# Patient Record
Sex: MALE | Race: WHITE
[De-identification: names, ages, dates, MRNs, and addresses within clinical notes are randomized per-mention and may not be internally consistent; named-entity substitution may affect disease eponyms.]

---

## 2020-01-02 ENCOUNTER — HOSPITAL ENCOUNTER (OUTPATIENT)
Dept: HOSPITAL 25 - OR | Age: 30
Discharge: HOME | End: 2020-01-02
Attending: ORTHOPAEDIC SURGERY
Payer: COMMERCIAL

## 2020-01-02 VITALS — SYSTOLIC BLOOD PRESSURE: 143 MMHG | DIASTOLIC BLOOD PRESSURE: 82 MMHG

## 2020-01-02 DIAGNOSIS — M25.775: ICD-10-CM

## 2020-01-02 DIAGNOSIS — M25.372: Primary | ICD-10-CM

## 2020-01-02 DIAGNOSIS — M25.371: ICD-10-CM

## 2020-01-02 PROCEDURE — C1776 JOINT DEVICE (IMPLANTABLE): HCPCS

## 2020-01-02 PROCEDURE — L8699 PROSTHETIC IMPLANT NOS: HCPCS

## 2020-01-02 PROCEDURE — C1713 ANCHOR/SCREW BN/BN,TIS/BN: HCPCS

## 2020-01-02 RX ADMIN — FENTANYL CITRATE PRN MCG: 0.05 INJECTION, SOLUTION INTRAMUSCULAR; INTRAVENOUS at 17:03

## 2020-01-02 RX ADMIN — FENTANYL CITRATE PRN MCG: 0.05 INJECTION, SOLUTION INTRAMUSCULAR; INTRAVENOUS at 16:24

## 2020-01-02 RX ADMIN — FENTANYL CITRATE PRN MCG: 0.05 INJECTION, SOLUTION INTRAMUSCULAR; INTRAVENOUS at 16:17

## 2020-01-02 RX ADMIN — FENTANYL CITRATE PRN MCG: 0.05 INJECTION, SOLUTION INTRAMUSCULAR; INTRAVENOUS at 16:34

## 2020-01-02 NOTE — OP
Operative Report - Blank





- Operative Report


Date of Operation: 01/02/20


Note: 


PATIENT: Wale Taylor





YOB: 1990





DATE OF SURGERY: 1/2/2020





SURGEON: Darius Jesus MD





ASSISTANT: EDNA Sumner, whos assistance was necessary for positioning, 

retraction, help with instrumentation, and closure.





ANESTHESIOLOGIST: Dr. Townsend





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 

exostosis.





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 

exostosis.





OPERATION:


1. Left ankle lateral ligament reconstruction with allograft.


2. Left calcaneal saucerization with excision of an enlarged peroneal tubercle.





ANESTHESIA: General





IMPLANTS: Arthrex Bio-Tenodesis screws x3. Semitendinosus allograft tendon.





TOURNIQUET TIME: Less than 2 hours with a well-padded thigh tourniquet at 250 

mmHg





SPECIMENS: none





ESTIMATED BLOOD LOSS: minimal





COMPLICATIONS: none





STATUS: Stable from the operating room to the recovery room and then home.





INDICATIONS FOR PROCEDURE:


Wale has chronic ankle instability with evidence of generalized ligamentous 

laxity.  He had a prior peroneal tenodesis, and has no pain in his peroneal 

tendons at this time.  However, he does have tenderness and pain at his 

enlarged peroneal tubercle.  Both operative and non operative treatment 

alternatives were reviewed. Further, the nature and risks of surgery were 

reviewed in careful detail, in the office as well as the pre-operative holding 

area. Our discussions regarding the risks of surgery included, but were not 

limited to, infection, wound problems, nerve injury, neuroma, RSD, persistent 

symptoms, recurrent instability, blood clot, failure of the surgery, and even 

the remote chance of catastrophic complication.





DESCRIPTION OF PROCEDURE:


The patient was seen in the preoperative holding unit and informed written 

consent was obtained.  The appropriate extremity was marked. The patient was 

then brought to the operating room and carefully positioned on the operating 

room table. Anesthesia was induced. All bony prominences were padded with great 

care.  A well-padded thigh tourniquet was placed.  A chlorhexidine based pre-

scrub was performed followed by a chloraprep prep and drape in standard sterile 

fashion. A surgical safety pause was then conducted in which we confirmed the 

appropriate patient, extremity, planned procedure, availability of equipment, 

indication and administration of prophylactic antibiotics, and DVT prophylaxis 

in the form of a compression boot on the non-surgical extremity. 





An Esmarch exsanguination of the limb was performed and the tourniquet 

inflated. I utilized his prior lateral ankle incision.  I carried the 

dissection down through the soft tissue to the level of the periosteum. I then 

dissected anteriorly to expose the anterolateral ankle ligaments.  There was 

marked thinning and elongation of the ATFL and CFL.  As suspected, these were 

not of sufficient quality to perform a standard Brostrom-Mckeon procedure alone.

  The ATFL and CFL were dissected off the distal fibula.  The insertion of the 

ATFL onto the talus was visualized.  A Beath pin guide pin was then driven into 

the body of the talus.  The trajectory and position was confirmed on 

fluoroscopy.  This was overdrilled and one end of the pre-sutured allograft 

tendon was placed into the drill hole.  This was fixed in position with a Bio-

Tenodesis screw.  This had excellent purchase and I was able to lift the leg 

off the bed by pulling up on the allograft.





I then drove a Beath pin through the distal fibula from the insertion of the 

ATFL into the peroneal groove.  This was then overdrilled.  I then placed 

another Beath pin from the insertion of the CFL to the peroneal groove, exiting 

at the same spot as the prior drill hole.  This was overdrilled as well.  There 

was more than 1 cm between the drill holes on the anterior inferior aspect of 

the fibula.  The drill tunnels formed a nice V shape in the distal fibula.  

The allograft tendon was then coated in sterile mineral oil and a suture passer 

was used to first pass it through the ATFL drill tunnel and then through the 

CFL drill tunnel.  The foot was then held in a neutral position and a Bio-

Tenodesis screw was placed into the ATFL tunnel.  This screw had excellent 

purchase.





I then visualized the insertion of the CFL into the calcaneus.  At this spot, a 

Beath pin was then driven into the calcaneus in an inferior and posterior 

direction.  This was driven out of the medial calcaneus and then overdrilled to 

make a drill tunnel.  The sutures on the end of the allograft were attached to 

the Beath pin and pulled through the calcaneus.  With the foot in a neutral 

position, a Bio-Tenodesis screw was placed into the calcaneal drill tunnel.  

This again had excellent purchase. There was restoration of stability on 

anterior drawer and inversion testing.





I then dissected distally to expose the enlarged peroneal tubercle of the 

calcaneus.  I was careful to avoid the peroneal tendons during this dissection.

  I then used a rongeur to excise the enlarged peroneal tubercle, thus 

performing a saucerization.





I then took the native ATFL tissue and sutured it to the distal fibula 

periosteum to reinforce the allograft reconstruction.  I then also sutured the 

inferior extensor retinaculum into the distal fibular periosteum (Mckeon 

modification).  After these repairs, the ankle and foot rested in an excellent 

position.  





We then irrigated the wound copiously. The wound was closed in a layered 

fashion utilizing 3-0 Monocryl and 3-0 nylon. A sterile dressing was then 

applied and the ankle was splinted in a neutral position. All needle and sponge 

counts were correct at the end of the case. The patient was awakened from 

anesthesia and transferred to the recovery room in stable condition. There were 

no complications.





ATTESTATION: I attest I was present and scrubbed and performed the critical 

portions of the procedure myself.





POST-OPERATIVE PLAN: The patient will remain non-weight-bearing for an 

anticipated duration of 6 weeks. Follow up will be in 2 weeks for likely suture 

removal and transition into a short leg cast.  We will use aspirin for DVT 

prophylaxis.